# Patient Record
Sex: FEMALE | Race: BLACK OR AFRICAN AMERICAN | NOT HISPANIC OR LATINO | Employment: UNEMPLOYED | URBAN - METROPOLITAN AREA
[De-identification: names, ages, dates, MRNs, and addresses within clinical notes are randomized per-mention and may not be internally consistent; named-entity substitution may affect disease eponyms.]

---

## 2019-03-04 ENCOUNTER — TELEPHONE (OUTPATIENT)
Dept: OBSTETRICS AND GYNECOLOGY | Facility: CLINIC | Age: 49
End: 2019-03-04

## 2019-06-13 ENCOUNTER — TELEPHONE (OUTPATIENT)
Dept: OBSTETRICS AND GYNECOLOGY | Facility: CLINIC | Age: 49
End: 2019-06-13

## 2019-08-02 ENCOUNTER — TELEPHONE (OUTPATIENT)
Dept: OBSTETRICS AND GYNECOLOGY | Facility: CLINIC | Age: 49
End: 2019-08-02

## 2021-11-11 ENCOUNTER — TELEPHONE (OUTPATIENT)
Dept: OBSTETRICS AND GYNECOLOGY | Facility: CLINIC | Age: 51
End: 2021-11-11

## 2022-09-12 ENCOUNTER — OFFICE VISIT (OUTPATIENT)
Dept: OBSTETRICS AND GYNECOLOGY | Facility: CLINIC | Age: 52
End: 2022-09-12

## 2022-09-12 ENCOUNTER — PROCEDURE VISIT (OUTPATIENT)
Dept: OBSTETRICS AND GYNECOLOGY | Facility: CLINIC | Age: 52
End: 2022-09-12

## 2022-09-12 ENCOUNTER — APPOINTMENT (OUTPATIENT)
Dept: WOMENS IMAGING | Facility: HOSPITAL | Age: 52
End: 2022-09-12

## 2022-09-12 VITALS
WEIGHT: 245 LBS | DIASTOLIC BLOOD PRESSURE: 79 MMHG | BODY MASS INDEX: 43.41 KG/M2 | HEIGHT: 63 IN | SYSTOLIC BLOOD PRESSURE: 134 MMHG

## 2022-09-12 DIAGNOSIS — Z78.0 POSTMENOPAUSE: ICD-10-CM

## 2022-09-12 DIAGNOSIS — Z12.31 VISIT FOR SCREENING MAMMOGRAM: Primary | ICD-10-CM

## 2022-09-12 DIAGNOSIS — Z01.419 WOMEN'S ANNUAL ROUTINE GYNECOLOGICAL EXAMINATION: Primary | ICD-10-CM

## 2022-09-12 PROCEDURE — 3015F CERV CANCER SCREEN DOCD: CPT | Performed by: NURSE PRACTITIONER

## 2022-09-12 PROCEDURE — G0101 CA SCREEN;PELVIC/BREAST EXAM: HCPCS | Performed by: NURSE PRACTITIONER

## 2022-09-12 PROCEDURE — 77063 BREAST TOMOSYNTHESIS BI: CPT | Performed by: NURSE PRACTITIONER

## 2022-09-12 PROCEDURE — 77067 SCR MAMMO BI INCL CAD: CPT | Performed by: NURSE PRACTITIONER

## 2022-09-12 PROCEDURE — 77067 SCR MAMMO BI INCL CAD: CPT | Performed by: RADIOLOGY

## 2022-09-12 PROCEDURE — 77063 BREAST TOMOSYNTHESIS BI: CPT | Performed by: RADIOLOGY

## 2022-09-12 NOTE — PROGRESS NOTES
GYN Annual Exam     Chief Complaint   Patient presents with   • Gynecologic Exam     New patient annual exam - MG today       Britt Choi is a 52 y.o. female who presents for annual well woman exam. She is postmenopausal. Prior total hysterectomy in 2016 with Dr Elam. She denies vaginal spotting or discharge. She completes SBE occasionally.     This is my first time meeting Britt Choi  She is a former pt of Dr Conrad  OB History    No obstetric history on file.         Mammogram: today  Colonoscopy: up to date, next is due '23  Last Pap : unsure  History of abnormal Pap smear: no  Family history of uterine, colon or ovarian cancer: no  Family history of breast cancer: no  History of abnormal mammogram: no    Menopause:  Bleeding since? no  Vasomotor symptoms: no  HRT: no  Dyspareunia: not currently sexually active      Past Medical History:   Diagnosis Date   • Asthma    • Depression    • GERD (gastroesophageal reflux disease)    • Hypertension    • Myocardial infarction (HCC)    • Sarcoidosis    • Stress-induced cardiomyopathy        Past Surgical History:   Procedure Laterality Date   • CARDIAC CATHETERIZATION     • CARDIAC DEFIBRILLATOR PLACEMENT     • CHOLECYSTECTOMY     • HYSTERECTOMY     • TRACHEOSTOMY           Current Outpatient Medications:   •  albuterol (PROVENTIL HFA;VENTOLIN HFA) 108 (90 BASE) MCG/ACT inhaler, Inhale 2 puffs Every 6 (Six) Hours., Disp: , Rfl:   •  aspirin  MG tablet, 325 mg., Disp: , Rfl:   •  carvedilol (COREG) 12.5 MG tablet, 12.5 mg., Disp: , Rfl:   •  citalopram (CeleXA) 10 MG tablet, 10 mg., Disp: , Rfl:   •  clonazePAM (KlonoPIN) 0.5 MG tablet, 0.5 mg 2 (Two) Times a Day., Disp: , Rfl:   •  cyanocobalamin (VITAMIN B-12) 1000 MCG tablet, Vitamin B-12  1,000 mcg tablet  TAKE 1 TABLET BY MOUTH EVERY DAY, Disp: , Rfl:   •  isosorbide dinitrate (ISORDIL) 5 MG tablet, 5 mg., Disp: , Rfl:   •  metoclopramide (REGLAN) 5 MG tablet, 5 mg., Disp: , Rfl:   •  pantoprazole (PROTONIX) 40 MG  "EC tablet, 40 mg., Disp: , Rfl:   •  vitamin D (ERGOCALCIFEROL) 11653 UNITS capsule capsule, 50,000 Units., Disp: , Rfl:     No Known Allergies    Social History     Tobacco Use   • Smoking status: Never Smoker   Substance Use Topics   • Alcohol use: No   • Drug use: No       History reviewed. No pertinent family history.    Review of Systems   Constitutional: Negative for chills, fatigue and fever.   Gastrointestinal: Negative for abdominal distention, abdominal pain, nausea and vomiting.   Endocrine: Negative for cold intolerance and heat intolerance.   Genitourinary: Negative for dyspareunia, dysuria, menstrual problem, pelvic pain, vaginal bleeding, vaginal discharge and vaginal pain.   Musculoskeletal: Negative for gait problem.   Skin: Negative for rash.   Neurological: Negative for dizziness and headaches.   Hematological: Does not bruise/bleed easily.   Psychiatric/Behavioral: Negative for behavioral problems.       /79   Ht 160 cm (63\")   Wt 111 kg (245 lb)   BMI 43.40 kg/m²     Physical Exam  Constitutional:       General: She is not in acute distress.     Appearance: Normal appearance. She is not ill-appearing, toxic-appearing or diaphoretic.   Genitourinary:      Vulva, bladder and urethral meatus normal.      No lesions in the vagina.      Right Labia: No rash, tenderness, lesions, skin changes or Bartholin's cyst.     Left Labia: No tenderness, lesions, skin changes, Bartholin's cyst or rash.     No labial fusion noted.      No inguinal adenopathy present in the right or left side.     Vaginal cuff intact.     No vaginal discharge, erythema, tenderness, bleeding, ulceration or granulation tissue.      No vaginal prolapse present.     No vaginal atrophy present.       Right Adnexa: absent.     Right Adnexa: not tender, not full, not palpable and no mass present.     Left Adnexa: absent.     Left Adnexa: not tender, not full, not palpable and no mass present.     Cervix is absent.      Uterus is " absent.      No urethral tenderness or mass present.      Pelvic exam was performed with patient in the lithotomy position.   Breasts: Breasts are symmetrical.      Right: Present. No swelling, bleeding, inverted nipple, mass, nipple discharge, skin change, tenderness, breast implant, axillary adenopathy or supraclavicular adenopathy.      Left: Present. No swelling, bleeding, inverted nipple, mass, nipple discharge, skin change, tenderness, breast implant, axillary adenopathy or supraclavicular adenopathy.       HENT:      Head: Normocephalic and atraumatic.   Eyes:      Pupils: Pupils are equal, round, and reactive to light.   Cardiovascular:      Rate and Rhythm: Normal rate.   Pulmonary:      Effort: Pulmonary effort is normal.   Abdominal:      General: There is no distension.      Palpations: Abdomen is soft. There is no mass.      Tenderness: There is no abdominal tenderness. There is no guarding.      Hernia: No hernia is present. There is no hernia in the left inguinal area or right inguinal area.   Musculoskeletal:         General: Normal range of motion.      Cervical back: Normal range of motion and neck supple. No tenderness.   Lymphadenopathy:      Cervical: No cervical adenopathy.      Upper Body:      Right upper body: No supraclavicular, axillary or pectoral adenopathy.      Left upper body: No supraclavicular, axillary or pectoral adenopathy.      Lower Body: No right inguinal adenopathy. No left inguinal adenopathy.   Neurological:      General: No focal deficit present.      Mental Status: She is alert and oriented to person, place, and time.      Cranial Nerves: No cranial nerve deficit.   Skin:     General: Skin is warm and dry.   Psychiatric:         Mood and Affect: Mood normal.         Behavior: Behavior normal.         Thought Content: Thought content normal.         Judgment: Judgment normal.   Vitals and nursing note reviewed.       Assessment    Diagnoses and all orders for this  visit:    1. Women's annual routine gynecological examination (Primary)  -     IGP, Apt HPV,rfx 16 / 18,45    2. Postmenopause       Plan     1) Breast Health - Clinical breast exam & mammogram yearly, Self breast awareness. Schedule screening mammogram.   2) Pap - collected, reviewed with pt this would be last pap smear if normal result. Reviewed recommendations for AE  3) STD-no  4) Smoking status- nonsmoker  5) Colon health - screening colonoscopy recommended if not up to date  6) Morbidly obese by BMI   7) Bone health - Weight bearing exercise, dietary calcium recommendations and vitamin D  reviewed.   8) Encouraged 150 minutes of exercise per week if not medically contraindicated    Follow up prn and one year    Camille King, APRN  9/12/2022  12:23 EDT

## 2022-09-16 LAB
CYTOLOGIST CVX/VAG CYTO: NORMAL
CYTOLOGY CVX/VAG DOC CYTO: NORMAL
CYTOLOGY CVX/VAG DOC THIN PREP: NORMAL
DX ICD CODE: NORMAL
HIV 1 & 2 AB SER-IMP: NORMAL
HPV I/H RISK 4 DNA CVX QL PROBE+SIG AMP: NEGATIVE
OTHER STN SPEC: NORMAL
STAT OF ADQ CVX/VAG CYTO-IMP: NORMAL

## 2022-09-20 ENCOUNTER — TELEPHONE (OUTPATIENT)
Dept: OBSTETRICS AND GYNECOLOGY | Facility: CLINIC | Age: 52
End: 2022-09-20

## 2022-09-20 NOTE — TELEPHONE ENCOUNTER
----- Message from DIAMOND Espinosa sent at 9/16/2022 12:56 PM EDT -----  Please let the patient know her pap smear was normal. Thanks

## 2022-09-20 NOTE — TELEPHONE ENCOUNTER
----- Message from DIAMOND Espinosa sent at 9/20/2022  8:31 AM EDT -----  Please let the pt know her mammogram was normal. Thanks

## 2023-10-25 ENCOUNTER — OFFICE VISIT (OUTPATIENT)
Dept: OBSTETRICS AND GYNECOLOGY | Facility: CLINIC | Age: 53
End: 2023-10-25
Payer: MEDICARE

## 2023-10-25 VITALS
WEIGHT: 223.4 LBS | DIASTOLIC BLOOD PRESSURE: 84 MMHG | HEIGHT: 63 IN | SYSTOLIC BLOOD PRESSURE: 137 MMHG | BODY MASS INDEX: 39.58 KG/M2

## 2023-10-25 DIAGNOSIS — Z78.0 POSTMENOPAUSE: ICD-10-CM

## 2023-10-25 DIAGNOSIS — Z01.419 WOMEN'S ANNUAL ROUTINE GYNECOLOGICAL EXAMINATION: Primary | ICD-10-CM

## 2023-10-25 RX ORDER — DICLOFENAC SODIUM 75 MG/1
1 TABLET, DELAYED RELEASE ORAL EVERY 12 HOURS SCHEDULED
COMMUNITY
Start: 2023-10-10

## 2023-10-25 RX ORDER — FLUTICASONE FUROATE AND VILANTEROL 200; 25 UG/1; UG/1
1 POWDER RESPIRATORY (INHALATION) DAILY
COMMUNITY
Start: 2023-09-19

## 2023-10-25 NOTE — PROGRESS NOTES
GYN Annual Exam     Chief Complaint   Patient presents with   • Gynecologic Exam     Patient is here today for her annual exam. Last AE and pap smear 9/22 NIL. Mammogram 9/22. Colonoscopy 10/13       Britt Choi is a 53 y.o. female who presents for annual well woman exam. She is postmenopausal. Prior total hysterectomy in 2016.  She denies vaginal spotting or discharge. She completes SBE monthly.     OB History    No obstetric history on file.         Mammogram: 9/2023  Dexa scan: low FRAX  Colonoscopy: 2021, reports she typically receives a letter when next colonoscopy is due. My prior notes indicate repeat was due this year. She will check on this.   Last Pap : 9/2022 NIL, HPV negative  History of abnormal Pap smear: no  Family history of uterine, colon or ovarian cancer: no  Family history of breast cancer: no  History of abnormal mammogram: no    Menopause:  Bleeding since? no  Vasomotor symptoms: yes  HRT: no  Dyspareunia: no      Past Medical History:   Diagnosis Date   • Asthma    • Depression    • GERD (gastroesophageal reflux disease)    • Hypertension    • Myocardial infarction    • Sarcoidosis    • Stress-induced cardiomyopathy        Past Surgical History:   Procedure Laterality Date   • CARDIAC CATHETERIZATION     • CARDIAC DEFIBRILLATOR PLACEMENT     • CHOLECYSTECTOMY     • HYSTERECTOMY     • TRACHEOSTOMY           Current Outpatient Medications:   •  albuterol (PROVENTIL HFA;VENTOLIN HFA) 108 (90 BASE) MCG/ACT inhaler, Inhale 2 puffs Every 6 (Six) Hours., Disp: , Rfl:   •  aspirin  MG tablet, 1 tablet., Disp: , Rfl:   •  carvedilol (COREG) 12.5 MG tablet, 1 tablet., Disp: , Rfl:   •  citalopram (CeleXA) 10 MG tablet, 1 tablet., Disp: , Rfl:   •  clonazePAM (KlonoPIN) 0.5 MG tablet, 1 tablet 2 (Two) Times a Day., Disp: , Rfl:   •  cyanocobalamin (VITAMIN B-12) 1000 MCG tablet, Vitamin B-12  1,000 mcg tablet  TAKE 1 TABLET BY MOUTH EVERY DAY, Disp: , Rfl:   •  diclofenac (VOLTAREN) 75 MG EC tablet,  "Take 1 tablet by mouth Every 12 (Twelve) Hours., Disp: , Rfl:   •  Fluticasone Furoate-Vilanterol (BREO ELLIPTA) 200-25 MCG/ACT inhaler, Inhale 1 puff Daily., Disp: , Rfl:   •  pantoprazole (PROTONIX) 40 MG EC tablet, 1 tablet., Disp: , Rfl:   •  vitamin D (ERGOCALCIFEROL) 35326 UNITS capsule capsule, 1 capsule., Disp: , Rfl:   •  isosorbide dinitrate (ISORDIL) 5 MG tablet, 1 tablet. (Patient not taking: Reported on 10/25/2023), Disp: , Rfl:   •  metoclopramide (REGLAN) 5 MG tablet, 1 tablet. (Patient not taking: Reported on 10/25/2023), Disp: , Rfl:     No Known Allergies    Social History     Tobacco Use   • Smoking status: Never   Vaping Use   • Vaping Use: Never used   Substance Use Topics   • Alcohol use: No   • Drug use: No       History reviewed. No pertinent family history.    Review of Systems   Constitutional:  Negative for chills, fatigue and fever.   Gastrointestinal:  Negative for abdominal distention, abdominal pain, nausea and vomiting.   Endocrine: Positive for heat intolerance. Negative for cold intolerance.   Genitourinary:  Negative for dyspareunia, dysuria, menstrual problem, pelvic pain, vaginal bleeding, vaginal discharge and vaginal pain.   Musculoskeletal:  Negative for gait problem.   Skin:  Negative for rash.   Neurological:  Negative for dizziness and headaches.   Hematological:  Does not bruise/bleed easily.   Psychiatric/Behavioral:  Negative for behavioral problems.        /84   Ht 160 cm (62.99\")   Wt 101 kg (223 lb 6.4 oz)   BMI 39.58 kg/m²     Physical Exam  Constitutional:       General: She is not in acute distress.     Appearance: Normal appearance. She is obese. She is not ill-appearing, toxic-appearing or diaphoretic.   Genitourinary:      Vulva, bladder and urethral meatus normal.      No lesions in the vagina.      Right Labia: No rash, tenderness, lesions, skin changes or Bartholin's cyst.     Left Labia: No tenderness, lesions, skin changes, Bartholin's cyst or " rash.     No labial fusion noted.      No inguinal adenopathy present in the right or left side.     Vaginal cuff intact.     No vaginal discharge, erythema, tenderness, bleeding or ulceration.      No vaginal prolapse present.     No vaginal atrophy present.       Right Adnexa: absent.     Right Adnexa: not tender, not full, not palpable and no mass present.     Left Adnexa: absent.     Left Adnexa: not tender, not full, not palpable and no mass present.     Cervix is absent.      Uterus is absent.      No urethral tenderness or mass present.      Pelvic exam was performed with patient in the lithotomy position.   Rectum:      No external hemorrhoid.   Breasts:     Breasts are symmetrical.      Right: Present. No swelling, bleeding, inverted nipple, mass, nipple discharge, skin change, tenderness or breast implant.      Left: Present. No swelling, bleeding, inverted nipple, mass, nipple discharge, skin change, tenderness or breast implant.   HENT:      Head: Normocephalic and atraumatic.   Eyes:      Pupils: Pupils are equal, round, and reactive to light.   Cardiovascular:      Rate and Rhythm: Normal rate.   Pulmonary:      Effort: Pulmonary effort is normal.   Abdominal:      General: There is no distension.      Palpations: Abdomen is soft. There is no mass.      Tenderness: There is no abdominal tenderness. There is no guarding.      Hernia: No hernia is present. There is no hernia in the left inguinal area or right inguinal area.   Musculoskeletal:         General: Normal range of motion.      Cervical back: Normal range of motion and neck supple. No tenderness.   Lymphadenopathy:      Cervical: No cervical adenopathy.      Upper Body:      Right upper body: No supraclavicular, axillary or pectoral adenopathy.      Left upper body: No supraclavicular, axillary or pectoral adenopathy.      Lower Body: No right inguinal adenopathy. No left inguinal adenopathy.   Neurological:      General: No focal deficit  present.      Mental Status: She is alert and oriented to person, place, and time.      Cranial Nerves: No cranial nerve deficit.   Skin:     General: Skin is warm and dry.   Psychiatric:         Mood and Affect: Mood normal.         Behavior: Behavior normal.         Thought Content: Thought content normal.         Judgment: Judgment normal.   Vitals and nursing note reviewed.       Assessment    Diagnoses and all orders for this visit:    1. Women's annual routine gynecological examination (Primary)    2. Postmenopause         Plan     1) Breast Health - Clinical breast exam & mammogram yearly, Self breast awareness. Schedule screening mammogram.   2) Pap - not indicated secondary to prior hysterectomy and no hx dysplasia  3) STD-no  4) Smoking status- nonsmoker  5) Colon health - screening colonoscopy recommended if not up to date  6) Obese by BMI 39.58  7) Bone health - Weight bearing exercise, dietary calcium recommendations and vitamin D  reviewed.   8) Encouraged 150 minutes of exercise per week if not medically contraindicated    Follow up prn and one year    Camille King, DIAMOND  10/25/2023  14:16 EDT

## 2025-04-03 ENCOUNTER — TELEPHONE (OUTPATIENT)
Dept: OBSTETRICS AND GYNECOLOGY | Facility: CLINIC | Age: 55
End: 2025-04-03
Payer: MEDICARE

## 2025-06-10 ENCOUNTER — TELEPHONE (OUTPATIENT)
Dept: OBSTETRICS AND GYNECOLOGY | Facility: CLINIC | Age: 55
End: 2025-06-10
Payer: MEDICARE

## 2025-06-10 NOTE — TELEPHONE ENCOUNTER
Called to confirm apt for 6/11 pt needed to cancel. Was rescheduling her but her phone got disconnected and was unable to call back due to line not available. If pt called back she just needs to schedule annual with Camille.